# Patient Record
Sex: FEMALE | Race: OTHER
[De-identification: names, ages, dates, MRNs, and addresses within clinical notes are randomized per-mention and may not be internally consistent; named-entity substitution may affect disease eponyms.]

---

## 2020-05-03 ENCOUNTER — HOSPITAL ENCOUNTER (OUTPATIENT)
Dept: HOSPITAL 62 - LC | Age: 25
Discharge: HOME | End: 2020-05-03
Attending: OBSTETRICS & GYNECOLOGY
Payer: SELF-PAY

## 2020-05-03 DIAGNOSIS — O47.03: Primary | ICD-10-CM

## 2020-05-03 DIAGNOSIS — Z3A.30: ICD-10-CM

## 2020-05-03 LAB
APPEARANCE UR: CLEAR
APTT PPP: (no result) S
BACTERIA (WET MOUNT): (no result)
BARBITURATES UR QL SCN: NEGATIVE
BILIRUB UR QL STRIP: NEGATIVE
CHLAM PCR: NOT DETECTED
EPITHELIALS (WET MOUNT): (no result)
GLUCOSE UR STRIP-MCNC: NEGATIVE MG/DL
KETONES UR STRIP-MCNC: NEGATIVE MG/DL
METHADONE UR QL SCN: NEGATIVE
NITRITE UR QL STRIP: NEGATIVE
PCP UR QL SCN: NEGATIVE
PH UR STRIP: 7 [PH] (ref 5–9)
PROT UR STRIP-MCNC: NEGATIVE MG/DL
RBCS (WET MOUNT): (no result)
SP GR UR STRIP: 1.01
T.VAGINALIS (WET MOUNT): (no result)
URINE AMPHETAMINES SCREEN: NEGATIVE
URINE BENZODIAZEPINES SCREEN: NEGATIVE
URINE COCAINE SCREEN: NEGATIVE
URINE MARIJUANA (THC) SCREEN: NEGATIVE
UROBILINOGEN UR-MCNC: NEGATIVE MG/DL (ref ?–2)
WBCS (WET MOUNT): (no result)
YEAST (WET MOUNT): (no result)

## 2020-05-03 PROCEDURE — 87591 N.GONORRHOEAE DNA AMP PROB: CPT

## 2020-05-03 PROCEDURE — 87491 CHLMYD TRACH DNA AMP PROBE: CPT

## 2020-05-03 PROCEDURE — 87210 SMEAR WET MOUNT SALINE/INK: CPT

## 2020-05-03 PROCEDURE — 76815 OB US LIMITED FETUS(S): CPT

## 2020-05-03 PROCEDURE — 81001 URINALYSIS AUTO W/SCOPE: CPT

## 2020-05-03 PROCEDURE — 80307 DRUG TEST PRSMV CHEM ANLYZR: CPT

## 2020-05-03 NOTE — RADIOLOGY REPORT (SQ)
Obstetric ultrasound: 5/3/2020 1:53 AM CDT



HISTORY: 24-year-old female with evaluate cervical length.



TECHNIQUE: Multiple grayscale and color Doppler images of the

pelvis were obtained transvaginally and transabdominally.



COMPARISON: None available for this pregnancy.



FINDINGS:  A single intrauterine gestation is seen, which is

cephalic in position. The placenta is posterior in location, and

free of internal os of the cervix. The estimated fetal heart rate

is approximately 137 bpm. The cervix measures at least 4.3 cm in

length.



The estimated fetal weight is approximately 1566 g +/- 15%. The

fetus overall measures at the 53%. The GLENN measures 12.4 cm, with

the deepest vertical pocket of approximately 2.6 cm. The fetus

measures at 30 weeks and 3 day(s) by AUA, consistent with an

estimated due date of 7/9/2020. This is different than the prior

estimated due date of 7/13/2020.



The following fetal measurements were obtained:

BPD: 7.5 cm, consistent with 29 weeks and 6 day(s).

HC: 28.6 cm, consistent with 31 weeks and 3 day(s).

AC: 26.3 cm, consistent with 30 weeks and 3 day(s).

FL: 5.8 cm, consistent with 30 weeks and 1 day(s).



IMPRESSION: A single, live intrauterine gestation is seen which

is currently cephalic in position. The fetus measures at 30 weeks

and 3 day(s) by AUA, consistent with an estimated due date of

7/9/2020. This is different than the prior estimated due date of

7/13/2020. 2.Detailed fetal anatomic assessment was not

performed. 3. The cervix measures at least 4.3 cm in length.



Interval follow-up with an obstetric care provider is

recommended.

## 2020-07-03 ENCOUNTER — HOSPITAL ENCOUNTER (INPATIENT)
Dept: HOSPITAL 62 - LC | Age: 25
LOS: 2 days | Discharge: HOME | End: 2020-07-05
Attending: OBSTETRICS & GYNECOLOGY | Admitting: OBSTETRICS & GYNECOLOGY
Payer: SELF-PAY

## 2020-07-03 DIAGNOSIS — Z3A.38: ICD-10-CM

## 2020-07-03 DIAGNOSIS — Z03.818: ICD-10-CM

## 2020-07-03 DIAGNOSIS — O32.2XX0: ICD-10-CM

## 2020-07-03 LAB
ADD MANUAL DIFF: NO
APPEARANCE UR: (no result)
APTT PPP: YELLOW S
BARBITURATES UR QL SCN: NEGATIVE
BASOPHILS # BLD AUTO: 0 10^3/UL (ref 0–0.2)
BASOPHILS NFR BLD AUTO: 0.6 % (ref 0–2)
BILIRUB UR QL STRIP: NEGATIVE
EOSINOPHIL # BLD AUTO: 0.1 10^3/UL (ref 0–0.6)
EOSINOPHIL NFR BLD AUTO: 1 % (ref 0–6)
ERYTHROCYTE [DISTWIDTH] IN BLOOD BY AUTOMATED COUNT: 14.5 % (ref 11.5–14)
GLUCOSE UR STRIP-MCNC: NEGATIVE MG/DL
HCT VFR BLD CALC: 35.2 % (ref 36–47)
HGB BLD-MCNC: 11.9 G/DL (ref 12–15.5)
KETONES UR STRIP-MCNC: NEGATIVE MG/DL
LYMPHOCYTES # BLD AUTO: 2.4 10^3/UL (ref 0.5–4.7)
LYMPHOCYTES NFR BLD AUTO: 30.8 % (ref 13–45)
MCH RBC QN AUTO: 25.7 PG (ref 27–33.4)
MCHC RBC AUTO-ENTMCNC: 33.7 G/DL (ref 32–36)
MCV RBC AUTO: 76 FL (ref 80–97)
METHADONE UR QL SCN: NEGATIVE
MONOCYTES # BLD AUTO: 0.9 10^3/UL (ref 0.1–1.4)
MONOCYTES NFR BLD AUTO: 10.9 % (ref 3–13)
NEUTROPHILS # BLD AUTO: 4.5 10^3/UL (ref 1.7–8.2)
NEUTS SEG NFR BLD AUTO: 56.7 % (ref 42–78)
NITRITE UR QL STRIP: NEGATIVE
PCP UR QL SCN: NEGATIVE
PH UR STRIP: 7 [PH] (ref 5–9)
PLATELET # BLD: 163 10^3/UL (ref 150–450)
PROT UR STRIP-MCNC: 30 MG/DL
RBC # BLD AUTO: 4.61 10^6/UL (ref 3.72–5.28)
SP GR UR STRIP: 1.01
TOTAL CELLS COUNTED % (AUTO): 100 %
URINE AMPHETAMINES SCREEN: NEGATIVE
URINE BENZODIAZEPINES SCREEN: NEGATIVE
URINE COCAINE SCREEN: NEGATIVE
URINE MARIJUANA (THC) SCREEN: NEGATIVE
UROBILINOGEN UR-MCNC: NEGATIVE MG/DL (ref ?–2)
WBC # BLD AUTO: 7.9 10^3/UL (ref 4–10.5)

## 2020-07-03 PROCEDURE — 59025 FETAL NON-STRESS TEST: CPT

## 2020-07-03 PROCEDURE — 94760 N-INVAS EAR/PLS OXIMETRY 1: CPT

## 2020-07-03 PROCEDURE — 86592 SYPHILIS TEST NON-TREP QUAL: CPT

## 2020-07-03 PROCEDURE — 76942 ECHO GUIDE FOR BIOPSY: CPT

## 2020-07-03 PROCEDURE — C9803 HOPD COVID-19 SPEC COLLECT: HCPCS

## 2020-07-03 PROCEDURE — 87635 SARS-COV-2 COVID-19 AMP PRB: CPT

## 2020-07-03 PROCEDURE — 64450 NJX AA&/STRD OTHER PN/BRANCH: CPT

## 2020-07-03 PROCEDURE — 85025 COMPLETE CBC W/AUTO DIFF WBC: CPT

## 2020-07-03 PROCEDURE — 36415 COLL VENOUS BLD VENIPUNCTURE: CPT

## 2020-07-03 PROCEDURE — 88307 TISSUE EXAM BY PATHOLOGIST: CPT

## 2020-07-03 PROCEDURE — 86901 BLOOD TYPING SEROLOGIC RH(D): CPT

## 2020-07-03 PROCEDURE — 86900 BLOOD TYPING SEROLOGIC ABO: CPT

## 2020-07-03 PROCEDURE — 85027 COMPLETE CBC AUTOMATED: CPT

## 2020-07-03 PROCEDURE — 94799 UNLISTED PULMONARY SVC/PX: CPT

## 2020-07-03 PROCEDURE — 86850 RBC ANTIBODY SCREEN: CPT

## 2020-07-03 PROCEDURE — 81005 URINALYSIS: CPT

## 2020-07-03 PROCEDURE — 80307 DRUG TEST PRSMV CHEM ANLYZR: CPT

## 2020-07-03 RX ADMIN — DOCUSATE SODIUM SCH MG: 100 CAPSULE, LIQUID FILLED ORAL at 18:04

## 2020-07-03 RX ADMIN — HYDROMORPHONE HYDROCHLORIDE PRN MG: 2 INJECTION INTRAMUSCULAR; INTRAVENOUS; SUBCUTANEOUS at 18:04

## 2020-07-03 RX ADMIN — KETOROLAC TROMETHAMINE SCH MG: 30 INJECTION, SOLUTION INTRAMUSCULAR at 22:06

## 2020-07-03 RX ADMIN — OXYCODONE AND ACETAMINOPHEN PRN TAB: 5; 325 TABLET ORAL at 20:51

## 2020-07-03 RX ADMIN — IBUPROFEN SCH: 800 TABLET, FILM COATED ORAL at 18:26

## 2020-07-03 NOTE — ADMISSION PHYSICAL
=================================================================



=================================================================

Datetime Report Generated by CPN: 2020 12:12

   

   

=================================================================

CURRENT ADMISSION

=================================================================

   

Chief Complaint:  Uterine Contractions

Indication for Induction:  Not Applicable

Admit Impression :  Term, Intrauterine Pregnancy

Admit Plan:  Admit to Unit; Initiate  Section Protocol

   

=================================================================

ALLERGIES

=================================================================

   

Medication Allergies:  No

Medication Allergies:  No Known Allergies (2020)

Latex:  Unknown

   

=================================================================

OBSTETRICAL HISTORY

=================================================================

   

EDC:  2020 00:00

:  2

Para:  1

Term:  1

:  0

SAB:  0

IAB:  0

Livin

Gestational Diabetes:  No

Rh Sensitization:  No

Incompetent Cervix:  No

FABRICE:  No

Infertility:  No

ART Treatment:  No

Uterine Anomaly:  No

IUGR:  No

Hx Previous C/S:  No

Macrosomia:  No

Hx Loss/Stillborn:  No

PIH:  No

Hx  Death:  No

Placenta Previa/Abruption:  No

Depression/PP Depression:  No

PTL/PROM:  No

Post Partum Hemorrhage:  Yes

Current Pregnancy Procedures:  Ultrasound

Obstetrical History Comments:  G1: , 3/2015, female @ 39 weeks

   G2: current

   

=================================================================

***SEE PRENATAL RECORDS***

=================================================================

   

Alcohol:  No

Marijuana :  No

Cocaine:  No

Other Illicit Drugs:  No

Cigarettes:  Never Smoker. [id number]

   

=================================================================

MEDICAL HISTORY

=================================================================

   

Diabetes:  No

Blood Transfusion:  No

Pulmonary Disease (Asthma, TB):  No

Breast Disease:  No

Hypertension:  No

Gyn Surgery:  No

Heart Disease:  No

Hosp/Surgery:  Yes

Autoimmune Disorder:  No

Anesthetic Complications:  No

Kidney Disease:  No

Abnormal Pap Smear:  No

Neuro/Epilepsy:  No

Psychiatric Disorders:  No

Other Medical Diseases:  No

Hepatitis/Liver Disease:  No

Significant Family History:  No

Varicosities/Phlebitis:  No

Trauma/Violence :  No

Thyroid Dysfunction:  No

Medical History Comments:  Appendicitis and gallbladder removal

   

=================================================================

INFECTIOUS HISTORY

=================================================================

   

Gonorrhea:  No

Genital Herpes:  No

Chlamydia:  No

Tuberculosis:  No

Syphilis:  No

Hepatitis:  No

HIV/AIDS Exposure:  No

Rash or Viral Illness:  No

HPV:  No

Infectious History Comments:  denies (Annotations: Data stored by [facility]

   on behalf of user)

   

=================================================================

PHYSICAL EXAM

=================================================================

   

General:  Normal

HEENT:  Normal

Neurologic:  Normal

Thyroid:  Normal

Heart:  Normal

Lungs:  Normal

Breast:  Deferred

Back:  Normal

Abdomen:  Normal

Genitourinary Exam:  Normal

Extremities:  Normal

DTRs:  Normal

Pelvic Type:  Adequate

Vital Signs:  Reviewed

   

=================================================================

VAGINAL EXAM

=================================================================

   

Dilatation:  4

Effacement:  100

   

=================================================================

MEMBRANES

=================================================================

   

Pooling:  Negative

Membranes:  Intact

   

=================================================================

FETUS A

=================================================================

   

EGA:  38.4

Monitoring:  External US

FHR- Baseline:  120

Variability:  Moderate 6-25bpm

Decelerations:  None

FHR Category:  Category II

Fetal Presentation:  Transverse

Admit Comment:  The baby fht are cat 2 with some late decells and is

   not vertex.  The fetal head is off to right pelvis and there is risk

   of cord prolapse.  I would recomend a c section in this situation.

   

=================================================================

PLANS FOR LABOR AND DELIVERY

=================================================================

   

Labor and Delivery:  None

Pain Management:  Epidural

Feeding Preference:  Both

Circumcision:  No

   

=================================================================

INFORMED CONSENT

=================================================================

   

Signature:  Electronically signed by MD WALDEMAR Recinos) on

   7/3/2020 at 12:11  with User ID: [email or web address]

## 2020-07-03 NOTE — OPERATIVE REPORT
Operative Report


DATE OF SURGERY: 20


PREOPERATIVE DIAGNOSIS: Patient presents for decreased fetal movement, NST shows

multiple late decelerations, cervix is 4 cm dilated but the head is not 

presenting into the pelvis and appears to be diverted off to the patient's right

which places her at risk for cord prolapse if the water were to break.


POSTOPERATIVE DIAGNOSIS: Same


OPERATION:  via low transverse uterine incision


SURGEON: RICK KING


ANESTHESIA: Spinal


TISSUE REMOVED OR ALTERED: Placenta


COMPLICATIONS: 





Meconium noted and Apgars of 1 6 and 8


ESTIMATED BLOOD LOSS: 400 cc


INTRAOPERATIVE FINDINGS: Viable baby with Apgars of 1, 6 and 8.


PROCEDURE: 





Patient was taken to the OR and placed in supine position after her spinal 

anesthesia.  She is prepared and draped in sterile fashion.  Smith was placed 

for drainage of the bladder.  Low transverse incision was made and carried down 

the level of the fascia.  The fascial incision was made with knife and extended 

bilaterally with curved Ulloa scissors.  The fascia was  off the rectus 

muscles using sharp and blunt dissection.  The rectus muscles are  in 

the midline.  The peritoneum was entered without incident.  Bladder blade was 

placed in uterine segment was identified.  The baby's head which was in the 

right pelvis was directed to the lower uterine segment.  A low transverse 

incision was made creating a bladder flap.  Bladder blade was placed low 

transverse uterine incision was made with the knife and extended with 

fingertips.  The baby was delivered with some fundal pressure.  Mouth and nose 

were suctioned free.  The cord is doubly clamped and cut.  Baby is passed off to

the pediatrician in attendance.  The placenta was manually extracted with 

trailing membranes.  The uterus was externalized  wrapped in a moist lap sponge.

 Uterine contents wiped free.  Uterus was closed with a running locking layer of

0 chromic suture using the second layer to imbricate the first completing a 

double layer closure of the uterus.  The serosa was closed with a running 2-0 

chromic stitch.  The pelvis was irrigated and suctioned free of fluid the uterus

was replaced in the abdomen.  The abdominal wall peritoneum was closed with 

running 2-0 chromic stitch.  Fascia was closed with a running 0 Vicryl in 2 

segments.  August's layer was brought together with 0 plain gut stitch and the 

skin was closed with running subcuticular 4-0 undyed Vicryl stitch.  The wound 

was dressed mother and baby did well.

## 2020-07-03 NOTE — DELIVERY SUMMARY
=================================================================

Del Sum A-C

=================================================================

Datetime Report Generated by CPN: 2020 18:34

   

   

=================================================================

DELIVERY PERSONNEL

=================================================================

   

DELIVERY PERSONNEL:  [id number]

Delivery Doctor::  Luz Maria Jordan MD

CRNA::  Tomas Holbrook CRNA

Labor and Delivery Nurse::  Nic Aguirre RN

Circulator::  Kaylee Flanagan RN

 Nurse Practitioner::  BRIDGER Washington

Nursery Nurse::  Apoorva Scherer RN

Nursery Nurse::  Moon Barry RN

Scrub Tech/CNA:  ST Lakisha

Scrub Tech/CNA:  Joyce Oliveira CST

Additional Personnel: :  Nic Aguirre RN

   

=================================================================

MATERNAL INFORMATION

=================================================================

   

Delivery Anesthesia:  Spinal

Medications After Delivery:  Pitocin 30 Units in 500ml NS/D5W

Delivery QBL:  620

Maternal Complications:  None

   

=================================================================

LABOR SUMMARY

=================================================================

   

EDC:  2020 00:00

No. Babies in Womb:  1

 Attempted:  No

Labor Anesthesia:  None

   

=================================================================

LABOR INFORMATION

=================================================================

   

Reason for Induction:  Not Applicable

Oxytocin:  N/A

Group B Beta Strep:  Negative

Antibiotics # of Doses:  1

Antibiotics Time of Last Dose:  1436

Name of Antibiotic Given:  Ancef 2 GM

Steroids Given:  None

Reason Steroids Not Administered:  Not Applicable

   

=================================================================

MEMBRANES

=================================================================

   

Membranes Rupture Method:  Artificial

Rupture of Membranes:  2020 14:26

Length of Rupture (hr):  0.03

Amniotic Fluid Color:  Meconium

Amniotic Fluid Amount:  Small

Amniotic Fluid Odor:  Normal

   

=================================================================

STAGES OF LABOR

=================================================================

   

Stage 3 hr:  0

Stage 3 min:  1

   

=================================================================

VAGINAL DELIVERY

=================================================================

   

Episiotomy:  None

Laceration #1:  None

Laceration Extension #1:  N/A

Laceration Repair:  Not Applicable

Sponge Count Correct:  N/A

Sharps Count Correct:  N/A

   

=================================================================

CSECTION DELIVERY

=================================================================

   

Primary Indication:  Nonreassuring Fetal Status

Secondary Indication:  Transverse/Complex Presentation

CSection Urgency:  Non-Scheduled

CSection Incidence:  Primary

Labor:  Labor

Elective:  Nonelective

CSection Incision:  Lower Uterine Transverse

   

=================================================================

BABY A INFORMATION

=================================================================

   

Infant Delivery Date/Time:  2020 14:28

Method of Delivery:  

Nurse Controlled Delivery:  No

Born in Route :  No

:  N/A

Forceps:  N/A

Vacuum Extraction:  Successful

Shoulder Dystocia :  No

   

=================================================================

ASSISTED DELIVERY BABY A

=================================================================

   

Vacuum Number of Pulls:  1

Vacuum/Forceps Comment:  see op note

   

=================================================================

PRESENTATION/POSITION BABY A

=================================================================

   

Presentation:  Cephalic

Cephalic Presentation:  N/A

Vertex Position:  transverse  

Breech Presentation:  N/A

   

=================================================================

PLACENTA INFORMATION BABY A

=================================================================

   

Placenta Delivery Time :  2020 14:29

Placenta Method of Delivery:  Manual Removal

Placenta Status:  Delivered

   

=================================================================

APGAR SCORES BABY A

=================================================================

   

Heart Rate 1 min:  Slow, Below 100 bpm

Resp Effort 1 min:  Absent

Reflex Irritability 1 min:  No Response

Muscle Tone 1 min:  Flaccid

Color 1 min:  Blue/Pale

Resuscitation Effort 1 min:  Tactile Stimulation; Oxygen; PPV/NCPAP

APGAR SCORE 1 MIN:  1

Heart Rate 5 min:  >100 bpm

Resp Effort 5 min:  Good Cry

Reflex Irritability 5 min:  Cough or Sneeze or Pulls Away

Muscle Tone 5 min:  Flaccid

Color 5 min:  Blue/Pale

Resuscitation Effort 5 min:  Oxygen; PPV/NCPAP

APGAR SCORE 5 MIN:  6

Heart Rate 10 min:  >100 bpm

Resp Effort 10 min:  Good Cry

Reflex Irritability 10 min:  Cough or Sneeze or Pulls Away

Muscle Tone 10 min:  Active Motion

Color 10 min:  Blue/Pale

Resuscitation Effort 10 min:  Oxygen; PPV/NCPAP

APGAR SCORE 10 MIN:  8

   

=================================================================

INFANT INFORMATION BABY A

=================================================================

   

Gestational Age at Delivery:  38.4

Gestational Status:  Early Term- 37- 38.6 Weeks

Infant Outcome :  Liveborn

Infant Condition :  Stable

Infant Sex:  Male

   

=================================================================

IDENTIFICATION BABY A

=================================================================

   

Infant Verification Date/Time:  2020 15:00

ID Band Number:  [id number]

Mother's Name Verified:  Yes

Infant Medical Record Number:  [id number]

RN Verifying Infant:  Nic RedCHINO

Additional Verifying Personnel:  Kaylee Flanagan RN

   

=================================================================

WEIGHT/LENGTH BABY A

=================================================================

   

Infant Birthweight (gm):  2693

Infant Weight (lb):  5

Infant Weight (oz):  15

Infant Length (in):  19.50

Infant Length (cm):  49.53

   

=================================================================

CORD INFORMATION BABY A

=================================================================

   

No. Cord Vessels:  3

Nuchal Cord :  N/A

Cord Blood Taken:  Yes-For Storage (Mom's Blood type +)

Infant Suction:  Mouth; Nose

   

=================================================================

ASSESSMENT BABY A

=================================================================

   

Infant Complications:  Decreased Variability; Multiple Late Decels;

   Meconium

Skin to Skin:  No

Neonatologist/ALS Called :  Yes

Infant Care By:  STEVEN Scherer RN

Transferred To:  NICU

   

=================================================================

BABY B INFORMATION

=================================================================

   

 :  N/A

## 2020-07-03 NOTE — OPERATIVE REPORT
Operative Report


DATE OF SURGERY: 07/03/20


OPERATION: Repeat c section


PROCEDURE: 





Addendum to op report.  A Kiwi vac was used with pressure in green with one pull

to deliver the fetal head.  This was done because the assistant was having 

problems pushing the baby out because of the patient girth.

## 2020-07-04 LAB
ERYTHROCYTE [DISTWIDTH] IN BLOOD BY AUTOMATED COUNT: 15.1 % (ref 11.5–14)
HCT VFR BLD CALC: 32.3 % (ref 36–47)
HGB BLD-MCNC: 10.7 G/DL (ref 12–15.5)
MCH RBC QN AUTO: 25.6 PG (ref 27–33.4)
MCHC RBC AUTO-ENTMCNC: 33.3 G/DL (ref 32–36)
MCV RBC AUTO: 77 FL (ref 80–97)
PLATELET # BLD: 167 10^3/UL (ref 150–450)
RBC # BLD AUTO: 4.2 10^6/UL (ref 3.72–5.28)
WBC # BLD AUTO: 11.5 10^3/UL (ref 4–10.5)

## 2020-07-04 RX ADMIN — IBUPROFEN SCH: 800 TABLET, FILM COATED ORAL at 14:49

## 2020-07-04 RX ADMIN — OXYCODONE AND ACETAMINOPHEN PRN TAB: 5; 325 TABLET ORAL at 22:05

## 2020-07-04 RX ADMIN — DOCUSATE SODIUM SCH MG: 100 CAPSULE, LIQUID FILLED ORAL at 14:50

## 2020-07-04 RX ADMIN — OXYCODONE AND ACETAMINOPHEN PRN TAB: 5; 325 TABLET ORAL at 08:21

## 2020-07-04 RX ADMIN — KETOROLAC TROMETHAMINE SCH MG: 30 INJECTION, SOLUTION INTRAMUSCULAR at 14:50

## 2020-07-04 RX ADMIN — HYDROMORPHONE HYDROCHLORIDE PRN MG: 2 INJECTION INTRAMUSCULAR; INTRAVENOUS; SUBCUTANEOUS at 14:39

## 2020-07-04 RX ADMIN — Medication SCH CAP: at 14:50

## 2020-07-04 RX ADMIN — DOCUSATE SODIUM SCH MG: 100 CAPSULE, LIQUID FILLED ORAL at 18:54

## 2020-07-04 RX ADMIN — IBUPROFEN SCH MG: 800 TABLET, FILM COATED ORAL at 18:54

## 2020-07-04 RX ADMIN — KETOROLAC TROMETHAMINE SCH MG: 30 INJECTION, SOLUTION INTRAMUSCULAR at 05:29

## 2020-07-04 RX ADMIN — IBUPROFEN SCH: 800 TABLET, FILM COATED ORAL at 00:08

## 2020-07-04 RX ADMIN — SIMETHICONE PRN MG: 80 TABLET, CHEWABLE ORAL at 04:40

## 2020-07-04 RX ADMIN — SIMETHICONE PRN MG: 80 TABLET, CHEWABLE ORAL at 08:21

## 2020-07-04 RX ADMIN — IBUPROFEN SCH: 800 TABLET, FILM COATED ORAL at 08:01

## 2020-07-04 RX ADMIN — HYDROMORPHONE HYDROCHLORIDE PRN MG: 2 INJECTION INTRAMUSCULAR; INTRAVENOUS; SUBCUTANEOUS at 04:20

## 2020-07-04 NOTE — PDOC PROGRESS REPORT
Subjective-OB


Progress Note for:: 20


Subjective: 





Resting in bed, mild incisional pain, sister with patient, hsb at [facility] with baby,

pt would like to be D/C ASAP to go to [facility], voiding, eating well





Physical Exam (OB)


Vital Signs: 


                                        











Temp Pulse Resp BP Pulse Ox


 


 98.1 F   79   18   116/63   98 


 


 20 07:41  20 07:41  20 07:41  20 07:41  20 07:41








                                 Intake & Output











 20





 06:59 06:59 06:59


 


Output Total  2300 


 


Balance  -2300 


 


Weight  99.1 kg 














- PIH/Pre-Eclampsia


DTR's: 2 +


Clonus: Negative


Headache: Absent


Epigastric Pain: No


Visual Changes: No





- 


Dressing Removed: No


Closure Type: opsite





- Lochia


Lochia Amount: Scant < 10 ml


Lochia Color: Rubra/Red





- Abdomen


Description: Soft, Round


Fundal Description: Firm, Midline


Fundal Height: u/u - u/2





Objective-Diagnostic


Laboratory: 


                                        





                                 20 13:14 





                                        











  20





  10:17 13:14 13:14


 


WBC   7.9 


 


RBC   4.61 


 


Hgb   11.9 L 


 


Hct   35.2 L 


 


MCV   76 L 


 


MCH   25.7 L 


 


MCHC   33.7 


 


RDW   14.5 H 


 


Plt Count   163 


 


Seg Neutrophils %   56.7 


 


Urine Color  YELLOW  


 


Urine Appearance  SLIGHTLY-CLOUDY  


 


Urine pH  7.0  


 


Ur Specific Gravity  1.010  


 


Urine Protein  30 H  


 


Urine Glucose (UA)  NEGATIVE  


 


Urine Ketones  NEGATIVE  


 


Urine Blood  NEGATIVE  


 


Urine Nitrite  NEGATIVE  


 


Ur Leukocyte Esterase  MODERATE H  


 


Blood Type    O POSITIVE


 


Antibody Screen    NEGATIVE














Assessment and Plan(PN)





- Assessment and Plan


(1) Delivery by  section


Is this a current diagnosis for this admission?: Yes   





- Time Spent with Patient


Time with patient: Less than 15 minutes


Medications reviewed and adjusted accordingly: Yes





- Disposition


Anticipated Discharge: Home


Within: within 24 hours - re-evaluate D/C after 24 hours post op

## 2020-07-05 VITALS — SYSTOLIC BLOOD PRESSURE: 87 MMHG | DIASTOLIC BLOOD PRESSURE: 50 MMHG

## 2020-07-05 RX ADMIN — OXYCODONE AND ACETAMINOPHEN PRN TAB: 5; 325 TABLET ORAL at 04:42

## 2020-07-05 RX ADMIN — Medication SCH CAP: at 09:15

## 2020-07-05 RX ADMIN — IBUPROFEN SCH MG: 800 TABLET, FILM COATED ORAL at 05:57

## 2020-07-05 RX ADMIN — IBUPROFEN SCH MG: 800 TABLET, FILM COATED ORAL at 00:26

## 2020-07-05 RX ADMIN — DOCUSATE SODIUM SCH MG: 100 CAPSULE, LIQUID FILLED ORAL at 09:15

## 2020-07-05 NOTE — PDOC DISCHARGE SUMMARY
Impression





- Admit/DC Date/PCP


Admission Date/Primary Care Provider: 


  20 12:14





  RICK KING MD





Discharge Date: 20





- Discharge Diagnosis


(1) Delivery by  section


Is this a current diagnosis for this admission?: Yes   





- Additional Information


Resuscitation Status: Full Code


Discharge Diet: As Tolerated, Regular


Discharge Activity: Activity As Tolerated, No Lifting Over 10 Pounds, No 

Lifting/Push/Pulling, Pelvic Rest


Referrals: 


RICK KING MD [Primary Care Provider] -  (rtc 1 week at [facility])


Prescriptions: 


Oxycodone HCl/Acetaminophen [Percocet 5-325 mg Tablet] 1 tab PO Q4HP PRN #20 

tablet


 PRN Reason: 


Ibuprofen [Motrin 800 mg Tablet] 800 mg PO Q6 #30 tablet


Home Medications: 








Prenatal Vits96/Iron Fum/Folic [Prenatal Tablet] 1 each PO DAILY 20 


Ibuprofen [Motrin 800 mg Tablet] 800 mg PO Q6 #30 tablet 20 


Oxycodone HCl/Acetaminophen [Percocet 5-325 mg Tablet] 1 tab PO Q4HP PRN #20 

tablet 20 











HPI


Gestational Age: 38.4


Reason(s) for Admission: Ceasarean Section-Primary - decrease fetal movement, 

non reassuring NST


Admission Note: 





decrease fetal movement, non-reassuring NST


Prenatal Procedures: NST, Ultrasound


Intrapartum Procedure(s): : Low Cervical, Transverse





Hospital Course


Hospital Course: 


routine post op





Results


Laboratory Results: 


                                        











WBC  11.5 10^3/uL (4.0-10.5)  H  20  08:20    


 


RBC  4.20 10^6/uL (3.72-5.28)   20  08:20    


 


Hgb  10.7 g/dL (12.0-15.5)  L  20  08:20    


 


Hct  32.3 % (36.0-47.0)  L  20  08:20    


 


MCV  77 fl (80-97)  L  20  08:20    


 


MCH  25.6 pg (27.0-33.4)  L  20  08:20    


 


MCHC  33.3 g/dL (32.0-36.0)   20  08:20    


 


RDW  15.1 % (11.5-14.0)  H  20  08:20    


 


Plt Count  167 10^3/uL (150-450)   20  08:20    


 


Lymph % (Auto)  30.8 % (13-45)   20  13:14    


 


Mono % (Auto)  10.9 % (3-13)   20  13:14    


 


Eos % (Auto)  1.0 % (0-6)   20  13:14    


 


Baso % (Auto)  0.6 % (0-2)   20  13:14    


 


Absolute Neuts (auto)  4.5 10^3/uL (1.7-8.2)   20  13:14    


 


Absolute Lymphs (auto)  2.4 10^3/uL (0.5-4.7)   20  13:14    


 


Absolute Monos (auto)  0.9 10^3/uL (0.1-1.4)   20  13:14    


 


Absolute Eos (auto)  0.1 10^3/uL (0.0-0.6)   20  13:14    


 


Absolute Basos (auto)  0.0 10^3/uL (0.0-0.2)   20  13:14    


 


Seg Neutrophils %  56.7 % (42-78)   20  13:14    


 


Urine Color  YELLOW   20  10:17    


 


Urine Appearance  SLIGHTLY-CLOUDY   20  10:17    


 


Urine pH  7.0  (5.0-9.0)   20  10:17    


 


Ur Specific Gravity  1.010   20  10:17    


 


Urine Protein  30 mg/dL (NEGATIVE)  H  20  10:17    


 


Urine Glucose (UA)  NEGATIVE mg/dL (NEGATIVE)   20  10:17    


 


Urine Ketones  NEGATIVE mg/dL (NEGATIVE)   20  10:17    


 


Urine Blood  NEGATIVE  (NEGATIVE)   20  10:17    


 


Urine Nitrite  NEGATIVE  (NEGATIVE)   20  10:17    


 


Urine Bilirubin  NEGATIVE  (NEGATIVE)   20  10:17    


 


Urine Urobilinogen  NEGATIVE mg/dL (<2.0)   20  10:17    


 


Ur Leukocyte Esterase  MODERATE  (NEGATIVE)  H  20  10:17    


 


Urine Ascorbic Acid  NEGATIVE  (NEGATIVE)   20  10:17    


 


Urine Opiates Screen  NEGATIVE   20  10:17    


 


Urine Methadone Screen  NEGATIVE   20  10:17    


 


Ur Barbiturates Screen  NEGATIVE   20  10:17    


 


Ur Phencyclidine Scrn  NEGATIVE   20  10:17    


 


Ur Amphetamines Screen  NEGATIVE   20  10:17    


 


U Benzodiazepines Scrn  NEGATIVE   20  10:17    


 


Urine Cocaine Screen  NEGATIVE   20  10:17    


 


U Marijuana (THC) Screen  NEGATIVE   20  10:17    


 


SARS-CoV-2 (PCR)  NEGATIVE  (NEGATIVE)   20  16:13    


 


Blood Type  O POSITIVE   20  13:14    


 


Antibody Screen  NEGATIVE   20  13:14    














Plan


Health Concerns: 


pain, baby in [facility]


Plan of Treatment: 


discharge home, going to [facility] today, rev S&S to report


Goals: 


no complications


Time Spent: Less than 30 Minutes

## 2020-07-05 NOTE — PDOC PROGRESS REPORT
Subjective-OB


Progress Note for:: 20


Subjective: 





Doing well, no c/o, ready to leave and go to [facility], baby doing better, sister at 

BS, passing gas, eating, walking, scant bleeding, pain under control





Physical Exam (OB)


Vital Signs: 


                                        











Temp Pulse Resp BP Pulse Ox


 


 97.7 F   83   18   110/58 L  98 


 


 20 04:38  20 04:38  20 04:38  20 04:38  20 04:38








                                 Intake & Output











 20





 06:59 06:59 06:59


 


Intake Total  350 400


 


Output Total 2300 500 


 


Balance -2300 -150 400


 


Weight 99.1 kg  














- PIH/Pre-Eclampsia


DTR's: 2 +


Clonus: Negative


Headache: Absent


Epigastric Pain: No


Visual Changes: No





- 


Dressing Removed: Yes


Incision: Dressing


Closure Type: opsite





- Lochia


Lochia Amount: Scant < 10 ml


Lochia Color: Rubra/Red





- Abdomen


Description: Tender, Soft


Hernia Present: No


Fundal Description: Firm, Midline


Fundal Height: u/u - u/2





Objective-Diagnostic


Laboratory: 


                                        





                                 20 08:20 











Assessment and Plan(PN)





- Assessment and Plan


(1) Delivery by  section


Is this a current diagnosis for this admission?: Yes   





- Time Spent with Patient


Time with patient: Less than 15 minutes


Medications reviewed and adjusted accordingly: Yes





- Disposition


Anticipated Discharge: Home


Within: within 24 hours - discharge today